# Patient Record
Sex: MALE | Race: WHITE | NOT HISPANIC OR LATINO | Employment: OTHER | URBAN - METROPOLITAN AREA
[De-identification: names, ages, dates, MRNs, and addresses within clinical notes are randomized per-mention and may not be internally consistent; named-entity substitution may affect disease eponyms.]

---

## 2018-07-16 ENCOUNTER — APPOINTMENT (OUTPATIENT)
Dept: RADIOLOGY | Facility: MEDICAL CENTER | Age: 73
End: 2018-07-16
Attending: EMERGENCY MEDICINE
Payer: COMMERCIAL

## 2018-07-16 ENCOUNTER — HOSPITAL ENCOUNTER (OUTPATIENT)
Facility: MEDICAL CENTER | Age: 73
End: 2018-07-17
Attending: EMERGENCY MEDICINE | Admitting: HOSPITALIST
Payer: COMMERCIAL

## 2018-07-16 DIAGNOSIS — S00.81XA ABRASION OF FOREHEAD, INITIAL ENCOUNTER: ICD-10-CM

## 2018-07-16 DIAGNOSIS — S42.92XA TRAUMATIC CLOSED DISPLACED FRACTURE OF LEFT SHOULDER WITH ANTERIOR DISLOCATION, INITIAL ENCOUNTER: ICD-10-CM

## 2018-07-16 DIAGNOSIS — S09.90XA CLOSED HEAD INJURY, INITIAL ENCOUNTER: ICD-10-CM

## 2018-07-16 DIAGNOSIS — R55 SYNCOPE, UNSPECIFIED SYNCOPE TYPE: ICD-10-CM

## 2018-07-16 LAB
ANION GAP SERPL CALC-SCNC: 14 MMOL/L (ref 0–11.9)
APTT PPP: 31.1 SEC (ref 24.7–36)
BASOPHILS # BLD AUTO: 0.2 % (ref 0–1.8)
BASOPHILS # BLD: 0.03 K/UL (ref 0–0.12)
BUN SERPL-MCNC: 16 MG/DL (ref 8–22)
CALCIUM SERPL-MCNC: 9.5 MG/DL (ref 8.5–10.5)
CHLORIDE SERPL-SCNC: 99 MMOL/L (ref 96–112)
CO2 SERPL-SCNC: 22 MMOL/L (ref 20–33)
CREAT SERPL-MCNC: 0.8 MG/DL (ref 0.5–1.4)
EOSINOPHIL # BLD AUTO: 0.01 K/UL (ref 0–0.51)
EOSINOPHIL NFR BLD: 0.1 % (ref 0–6.9)
ERYTHROCYTE [DISTWIDTH] IN BLOOD BY AUTOMATED COUNT: 51.6 FL (ref 35.9–50)
GLUCOSE SERPL-MCNC: 130 MG/DL (ref 65–99)
HCT VFR BLD AUTO: 46.2 % (ref 42–52)
HGB BLD-MCNC: 15.6 G/DL (ref 14–18)
IMM GRANULOCYTES # BLD AUTO: 0.11 K/UL (ref 0–0.11)
IMM GRANULOCYTES NFR BLD AUTO: 0.8 % (ref 0–0.9)
INR PPP: 1.16 (ref 0.87–1.13)
LYMPHOCYTES # BLD AUTO: 1.4 K/UL (ref 1–4.8)
LYMPHOCYTES NFR BLD: 10.7 % (ref 22–41)
MCH RBC QN AUTO: 34.5 PG (ref 27–33)
MCHC RBC AUTO-ENTMCNC: 33.8 G/DL (ref 33.7–35.3)
MCV RBC AUTO: 102.2 FL (ref 81.4–97.8)
MONOCYTES # BLD AUTO: 0.49 K/UL (ref 0–0.85)
MONOCYTES NFR BLD AUTO: 3.7 % (ref 0–13.4)
NEUTROPHILS # BLD AUTO: 11.04 K/UL (ref 1.82–7.42)
NEUTROPHILS NFR BLD: 84.5 % (ref 44–72)
NRBC # BLD AUTO: 0 K/UL
NRBC BLD-RTO: 0 /100 WBC
PLATELET # BLD AUTO: 209 K/UL (ref 164–446)
PMV BLD AUTO: 9.7 FL (ref 9–12.9)
POTASSIUM SERPL-SCNC: 4 MMOL/L (ref 3.6–5.5)
PROTHROMBIN TIME: 14.5 SEC (ref 12–14.6)
RBC # BLD AUTO: 4.52 M/UL (ref 4.7–6.1)
SODIUM SERPL-SCNC: 135 MMOL/L (ref 135–145)
TROPONIN I SERPL-MCNC: 0.01 NG/ML (ref 0–0.04)
WBC # BLD AUTO: 13.1 K/UL (ref 4.8–10.8)

## 2018-07-16 PROCEDURE — 84484 ASSAY OF TROPONIN QUANT: CPT

## 2018-07-16 PROCEDURE — 700111 HCHG RX REV CODE 636 W/ 250 OVERRIDE (IP): Performed by: EMERGENCY MEDICINE

## 2018-07-16 PROCEDURE — 85730 THROMBOPLASTIN TIME PARTIAL: CPT

## 2018-07-16 PROCEDURE — 71045 X-RAY EXAM CHEST 1 VIEW: CPT

## 2018-07-16 PROCEDURE — 23650 CLTX SHO DSLC W/MNPJ WO ANES: CPT

## 2018-07-16 PROCEDURE — 70450 CT HEAD/BRAIN W/O DYE: CPT

## 2018-07-16 PROCEDURE — G0378 HOSPITAL OBSERVATION PER HR: HCPCS

## 2018-07-16 PROCEDURE — 96361 HYDRATE IV INFUSION ADD-ON: CPT

## 2018-07-16 PROCEDURE — 85025 COMPLETE CBC W/AUTO DIFF WBC: CPT

## 2018-07-16 PROCEDURE — A9270 NON-COVERED ITEM OR SERVICE: HCPCS | Performed by: EMERGENCY MEDICINE

## 2018-07-16 PROCEDURE — 93005 ELECTROCARDIOGRAM TRACING: CPT | Performed by: EMERGENCY MEDICINE

## 2018-07-16 PROCEDURE — 99220 PR INITIAL OBSERVATION CARE,LEVL III: CPT | Performed by: HOSPITALIST

## 2018-07-16 PROCEDURE — 96375 TX/PRO/DX INJ NEW DRUG ADDON: CPT

## 2018-07-16 PROCEDURE — 73030 X-RAY EXAM OF SHOULDER: CPT | Mod: LT

## 2018-07-16 PROCEDURE — 700105 HCHG RX REV CODE 258: Performed by: EMERGENCY MEDICINE

## 2018-07-16 PROCEDURE — 72125 CT NECK SPINE W/O DYE: CPT

## 2018-07-16 PROCEDURE — 85610 PROTHROMBIN TIME: CPT

## 2018-07-16 PROCEDURE — 36415 COLL VENOUS BLD VENIPUNCTURE: CPT

## 2018-07-16 PROCEDURE — 700102 HCHG RX REV CODE 250 W/ 637 OVERRIDE(OP): Performed by: EMERGENCY MEDICINE

## 2018-07-16 PROCEDURE — 700101 HCHG RX REV CODE 250: Performed by: EMERGENCY MEDICINE

## 2018-07-16 PROCEDURE — 80048 BASIC METABOLIC PNL TOTAL CA: CPT

## 2018-07-16 PROCEDURE — 99285 EMERGENCY DEPT VISIT HI MDM: CPT

## 2018-07-16 PROCEDURE — 96374 THER/PROPH/DIAG INJ IV PUSH: CPT

## 2018-07-16 RX ORDER — AMOXICILLIN 250 MG
2 CAPSULE ORAL 2 TIMES DAILY
Status: DISCONTINUED | OUTPATIENT
Start: 2018-07-16 | End: 2018-07-17 | Stop reason: HOSPADM

## 2018-07-16 RX ORDER — SIMVASTATIN 20 MG
20 TABLET ORAL EVERY EVENING
COMMUNITY

## 2018-07-16 RX ORDER — SODIUM CHLORIDE 9 MG/ML
INJECTION, SOLUTION INTRAVENOUS CONTINUOUS
Status: DISCONTINUED | OUTPATIENT
Start: 2018-07-16 | End: 2018-07-17 | Stop reason: HOSPADM

## 2018-07-16 RX ORDER — FUROSEMIDE 20 MG/1
20-40 TABLET ORAL 2 TIMES DAILY
COMMUNITY

## 2018-07-16 RX ORDER — HYDROCODONE BITARTRATE AND ACETAMINOPHEN 5; 325 MG/1; MG/1
1 TABLET ORAL ONCE
Status: COMPLETED | OUTPATIENT
Start: 2018-07-16 | End: 2018-07-16

## 2018-07-16 RX ORDER — ONDANSETRON 2 MG/ML
4 INJECTION INTRAMUSCULAR; INTRAVENOUS EVERY 4 HOURS PRN
Status: DISCONTINUED | OUTPATIENT
Start: 2018-07-16 | End: 2018-07-17 | Stop reason: HOSPADM

## 2018-07-16 RX ORDER — OXYCODONE HYDROCHLORIDE 5 MG/1
2.5 TABLET ORAL
Status: DISCONTINUED | OUTPATIENT
Start: 2018-07-16 | End: 2018-07-17

## 2018-07-16 RX ORDER — ONDANSETRON 4 MG/1
4 TABLET, ORALLY DISINTEGRATING ORAL EVERY 4 HOURS PRN
Status: DISCONTINUED | OUTPATIENT
Start: 2018-07-16 | End: 2018-07-17 | Stop reason: HOSPADM

## 2018-07-16 RX ORDER — DABIGATRAN ETEXILATE 150 MG/1
150 CAPSULE ORAL 2 TIMES DAILY
Status: DISCONTINUED | OUTPATIENT
Start: 2018-07-16 | End: 2018-07-17 | Stop reason: HOSPADM

## 2018-07-16 RX ORDER — ACETAMINOPHEN 325 MG/1
650 TABLET ORAL EVERY 6 HOURS PRN
Status: DISCONTINUED | OUTPATIENT
Start: 2018-07-16 | End: 2018-07-17 | Stop reason: HOSPADM

## 2018-07-16 RX ORDER — ONDANSETRON 2 MG/ML
4 INJECTION INTRAMUSCULAR; INTRAVENOUS ONCE
Status: COMPLETED | OUTPATIENT
Start: 2018-07-16 | End: 2018-07-16

## 2018-07-16 RX ORDER — VALSARTAN 160 MG/1
160 TABLET ORAL DAILY
COMMUNITY

## 2018-07-16 RX ORDER — LATANOPROST 50 UG/ML
1 SOLUTION/ DROPS OPHTHALMIC NIGHTLY
COMMUNITY

## 2018-07-16 RX ORDER — POLYETHYLENE GLYCOL 3350 17 G/17G
1 POWDER, FOR SOLUTION ORAL
Status: DISCONTINUED | OUTPATIENT
Start: 2018-07-16 | End: 2018-07-17 | Stop reason: HOSPADM

## 2018-07-16 RX ORDER — OXYCODONE HYDROCHLORIDE 5 MG/1
5 TABLET ORAL
Status: DISCONTINUED | OUTPATIENT
Start: 2018-07-16 | End: 2018-07-17

## 2018-07-16 RX ORDER — KETAMINE HYDROCHLORIDE 50 MG/ML
0.5 INJECTION, SOLUTION INTRAMUSCULAR; INTRAVENOUS ONCE
Status: COMPLETED | OUTPATIENT
Start: 2018-07-16 | End: 2018-07-16

## 2018-07-16 RX ORDER — SODIUM CHLORIDE 9 MG/ML
1000 INJECTION, SOLUTION INTRAVENOUS ONCE
Status: COMPLETED | OUTPATIENT
Start: 2018-07-16 | End: 2018-07-16

## 2018-07-16 RX ORDER — BISACODYL 10 MG
10 SUPPOSITORY, RECTAL RECTAL
Status: DISCONTINUED | OUTPATIENT
Start: 2018-07-16 | End: 2018-07-17 | Stop reason: HOSPADM

## 2018-07-16 RX ORDER — MORPHINE SULFATE 2 MG/ML
2 INJECTION, SOLUTION INTRAMUSCULAR; INTRAVENOUS
Status: DISCONTINUED | OUTPATIENT
Start: 2018-07-16 | End: 2018-07-17

## 2018-07-16 RX ORDER — DABIGATRAN ETEXILATE 150 MG/1
150 CAPSULE ORAL 2 TIMES DAILY
COMMUNITY

## 2018-07-16 RX ADMIN — SODIUM CHLORIDE 1000 ML: 9 INJECTION, SOLUTION INTRAVENOUS at 21:12

## 2018-07-16 RX ADMIN — KETAMINE HYDROCHLORIDE 64.5 MG: 50 INJECTION, SOLUTION INTRAMUSCULAR; INTRAVENOUS at 21:28

## 2018-07-16 RX ADMIN — HYDROCODONE BITARTRATE AND ACETAMINOPHEN 1 TABLET: 5; 325 TABLET ORAL at 19:41

## 2018-07-16 RX ADMIN — ONDANSETRON HYDROCHLORIDE 4 MG: 2 INJECTION, SOLUTION INTRAMUSCULAR; INTRAVENOUS at 21:23

## 2018-07-16 ASSESSMENT — PAIN SCALES - GENERAL: PAINLEVEL_OUTOF10: 10

## 2018-07-16 ASSESSMENT — LIFESTYLE VARIABLES: DO YOU DRINK ALCOHOL: YES

## 2018-07-17 VITALS
SYSTOLIC BLOOD PRESSURE: 153 MMHG | OXYGEN SATURATION: 94 % | RESPIRATION RATE: 16 BRPM | WEIGHT: 291.67 LBS | HEART RATE: 67 BPM | HEIGHT: 72 IN | BODY MASS INDEX: 39.51 KG/M2 | TEMPERATURE: 98.2 F | DIASTOLIC BLOOD PRESSURE: 71 MMHG

## 2018-07-17 PROBLEM — R55 SYNCOPE: Status: ACTIVE | Noted: 2018-07-17

## 2018-07-17 PROBLEM — G93.40 ACUTE ENCEPHALOPATHY: Status: ACTIVE | Noted: 2018-07-17

## 2018-07-17 PROBLEM — I48.20 CHRONIC ATRIAL FIBRILLATION (HCC): Chronic | Status: ACTIVE | Noted: 2018-07-17

## 2018-07-17 PROBLEM — S43.015A ANTERIOR DISLOCATION OF LEFT SHOULDER: Status: ACTIVE | Noted: 2018-07-17

## 2018-07-17 PROBLEM — E03.9 HYPOTHYROIDISM: Chronic | Status: ACTIVE | Noted: 2018-07-17

## 2018-07-17 LAB
LV EJECT FRACT  99904: 65
LV EJECT FRACT MOD 2C 99903: 66.17
LV EJECT FRACT MOD 4C 99902: 71
LV EJECT FRACT MOD BP 99901: 69.38

## 2018-07-17 PROCEDURE — 700102 HCHG RX REV CODE 250 W/ 637 OVERRIDE(OP): Performed by: HOSPITALIST

## 2018-07-17 PROCEDURE — 97161 PT EVAL LOW COMPLEX 20 MIN: CPT

## 2018-07-17 PROCEDURE — G0378 HOSPITAL OBSERVATION PER HR: HCPCS

## 2018-07-17 PROCEDURE — G8988 SELF CARE GOAL STATUS: HCPCS | Mod: CI

## 2018-07-17 PROCEDURE — 96376 TX/PRO/DX INJ SAME DRUG ADON: CPT

## 2018-07-17 PROCEDURE — 99217 PR OBSERVATION CARE DISCHARGE: CPT | Performed by: HOSPITALIST

## 2018-07-17 PROCEDURE — 93306 TTE W/DOPPLER COMPLETE: CPT

## 2018-07-17 PROCEDURE — 93880 EXTRACRANIAL BILAT STUDY: CPT

## 2018-07-17 PROCEDURE — G8979 MOBILITY GOAL STATUS: HCPCS | Mod: CI

## 2018-07-17 PROCEDURE — G8978 MOBILITY CURRENT STATUS: HCPCS | Mod: CI

## 2018-07-17 PROCEDURE — 93306 TTE W/DOPPLER COMPLETE: CPT | Mod: 26 | Performed by: INTERNAL MEDICINE

## 2018-07-17 PROCEDURE — 700105 HCHG RX REV CODE 258: Performed by: HOSPITALIST

## 2018-07-17 PROCEDURE — G8987 SELF CARE CURRENT STATUS: HCPCS | Mod: CJ

## 2018-07-17 PROCEDURE — 700111 HCHG RX REV CODE 636 W/ 250 OVERRIDE (IP): Performed by: HOSPITALIST

## 2018-07-17 PROCEDURE — A9270 NON-COVERED ITEM OR SERVICE: HCPCS | Performed by: HOSPITALIST

## 2018-07-17 PROCEDURE — 97165 OT EVAL LOW COMPLEX 30 MIN: CPT

## 2018-07-17 PROCEDURE — G8980 MOBILITY D/C STATUS: HCPCS | Mod: CI

## 2018-07-17 RX ORDER — SODIUM CHLORIDE 9 MG/ML
INJECTION, SOLUTION INTRAVENOUS
Status: DISCONTINUED
Start: 2018-07-17 | End: 2018-07-17 | Stop reason: HOSPADM

## 2018-07-17 RX ORDER — MORPHINE SULFATE 4 MG/ML
2 INJECTION, SOLUTION INTRAMUSCULAR; INTRAVENOUS
Status: DISCONTINUED | OUTPATIENT
Start: 2018-07-17 | End: 2018-07-17 | Stop reason: HOSPADM

## 2018-07-17 RX ORDER — OXYCODONE HYDROCHLORIDE 5 MG/1
2.5 TABLET ORAL
Status: DISCONTINUED | OUTPATIENT
Start: 2018-07-17 | End: 2018-07-17 | Stop reason: HOSPADM

## 2018-07-17 RX ORDER — OXYCODONE HYDROCHLORIDE 5 MG/1
5 TABLET ORAL
Status: DISCONTINUED | OUTPATIENT
Start: 2018-07-17 | End: 2018-07-17 | Stop reason: HOSPADM

## 2018-07-17 RX ORDER — AMOXICILLIN 250 MG
CAPSULE ORAL
Status: DISCONTINUED
Start: 2018-07-17 | End: 2018-07-17 | Stop reason: HOSPADM

## 2018-07-17 RX ORDER — ONDANSETRON 2 MG/ML
INJECTION INTRAMUSCULAR; INTRAVENOUS
Status: DISPENSED
Start: 2018-07-17 | End: 2018-07-17

## 2018-07-17 RX ADMIN — DABIGATRAN ETEXILATE MESYLATE 150 MG: 150 CAPSULE ORAL at 06:34

## 2018-07-17 RX ADMIN — ONDANSETRON 4 MG: 2 INJECTION INTRAMUSCULAR; INTRAVENOUS at 00:59

## 2018-07-17 RX ADMIN — SODIUM CHLORIDE: 9 INJECTION, SOLUTION INTRAVENOUS at 06:24

## 2018-07-17 ASSESSMENT — COGNITIVE AND FUNCTIONAL STATUS - GENERAL
DRESSING REGULAR LOWER BODY CLOTHING: A LITTLE
MOBILITY SCORE: 19
DAILY ACTIVITIY SCORE: 23
DAILY ACTIVITIY SCORE: 21
SUGGESTED CMS G CODE MODIFIER MOBILITY: CK
SUGGESTED CMS G CODE MODIFIER DAILY ACTIVITY: CI
MOBILITY SCORE: 24
TOILETING: A LITTLE
HELP NEEDED FOR BATHING: A LITTLE
WALKING IN HOSPITAL ROOM: A LITTLE
MOVING FROM LYING ON BACK TO SITTING ON SIDE OF FLAT BED: A LITTLE
DRESSING REGULAR LOWER BODY CLOTHING: A LITTLE
MOVING TO AND FROM BED TO CHAIR: A LITTLE
CLIMB 3 TO 5 STEPS WITH RAILING: A LITTLE
SUGGESTED CMS G CODE MODIFIER MOBILITY: CH
SUGGESTED CMS G CODE MODIFIER DAILY ACTIVITY: CJ
STANDING UP FROM CHAIR USING ARMS: A LITTLE

## 2018-07-17 ASSESSMENT — COPD QUESTIONNAIRES
COPD SCREENING SCORE: 2
DURING THE PAST 4 WEEKS HOW MUCH DID YOU FEEL SHORT OF BREATH: NONE/LITTLE OF THE TIME
DO YOU EVER COUGH UP ANY MUCUS OR PHLEGM?: YES, A FEW DAYS A WEEK OR MONTH
HAVE YOU SMOKED AT LEAST 100 CIGARETTES IN YOUR ENTIRE LIFE: NO/DON'T KNOW
HAVE YOU SMOKED AT LEAST 100 CIGARETTES IN YOUR ENTIRE LIFE: NO/DON'T KNOW
DURING THE PAST 4 WEEKS HOW MUCH DID YOU FEEL SHORT OF BREATH: NONE/LITTLE OF THE TIME
COPD SCREENING SCORE: 3
IN THE PAST 12 MONTHS DO YOU DO LESS THAN YOU USED TO BECAUSE OF YOUR BREATHING PROBLEMS: DISAGREE/UNSURE
DO YOU EVER COUGH UP ANY MUCUS OR PHLEGM?: NO/ONLY WITH OCCASIONAL COLDS OR INFECTIONS

## 2018-07-17 ASSESSMENT — LIFESTYLE VARIABLES
ON A TYPICAL DAY WHEN YOU DRINK ALCOHOL HOW MANY DRINKS DO YOU HAVE: 2
EVER HAD A DRINK FIRST THING IN THE MORNING TO STEADY YOUR NERVES TO GET RID OF A HANGOVER: NO
EVER_SMOKED: NEVER
AVERAGE NUMBER OF DAYS PER WEEK YOU HAVE A DRINK CONTAINING ALCOHOL: 7
EVER_SMOKED: NEVER
DOES PATIENT WANT TO TALK TO SOMEONE ABOUT QUITTING: NO
DOES PATIENT WANT TO STOP DRINKING: YES
HOW MANY TIMES IN THE PAST YEAR HAVE YOU HAD 5 OR MORE DRINKS IN A DAY: 0
TOTAL SCORE: 2
TOTAL SCORE: 2
ALCOHOL_USE: YES
CONSUMPTION TOTAL: POSITIVE
EVER FELT BAD OR GUILTY ABOUT YOUR DRINKING: YES
HAVE YOU EVER FELT YOU SHOULD CUT DOWN ON YOUR DRINKING: YES
HAVE PEOPLE ANNOYED YOU BY CRITICIZING YOUR DRINKING: NO
TOTAL SCORE: 2

## 2018-07-17 ASSESSMENT — CHA2DS2 SCORE
PRIOR STROKE OR TIA OR THROMBOEMBOLISM: NO
SEX: MALE
HYPERTENSION: YES
AGE 75 OR GREATER: NO
VASCULAR DISEASE: NO
CHA2DS2 VASC SCORE: 2
CHF OR LEFT VENTRICULAR DYSFUNCTION: NO
DIABETES: NO
AGE 65 TO 74: YES

## 2018-07-17 ASSESSMENT — GAIT ASSESSMENTS
ASSISTIVE DEVICE: FRONT WHEEL WALKER
GAIT LEVEL OF ASSIST: STAND BY ASSIST
DISTANCE (FEET): 150
DEVIATION: BRADYKINETIC

## 2018-07-17 ASSESSMENT — PATIENT HEALTH QUESTIONNAIRE - PHQ9
1. LITTLE INTEREST OR PLEASURE IN DOING THINGS: NOT AT ALL
2. FEELING DOWN, DEPRESSED, IRRITABLE, OR HOPELESS: NOT AT ALL
SUM OF ALL RESPONSES TO PHQ9 QUESTIONS 1 AND 2: 0

## 2018-07-17 ASSESSMENT — ACTIVITIES OF DAILY LIVING (ADL): TOILETING: INDEPENDENT

## 2018-07-17 ASSESSMENT — PAIN SCALES - GENERAL
PAINLEVEL_OUTOF10: 0

## 2018-07-17 NOTE — PROGRESS NOTES
Pt discharged via wheelchair with wife to car to home. Pt was AOx4 at time of discharge. PIVs removed, tele box taken off. RN went over discharge instructions thoroughly including medications, healthy diet, activity, signs and symptoms of infection, heart attack and stroke. RN reviewed when to call MD and when to call 911. Paperwork signed and all questions answered. Follow up appointment recommended to patient, to see his PCP within 1 week. Pt verbally understands that following through with the appointment is necessary. All personal belongings with patient. Copy of AVS discharge instructions given to patient.   Pt refused flu and pneumonia vaccines.

## 2018-07-17 NOTE — PROGRESS NOTES
Bedside report completed with noc RN. Assumed patient care. Pt resting, denies pain, he is alert and awake. Wife at bedside. Bed locked in low position, call light within reach.  Patient is asking when he will be discharged. RN updated him again on POC, carotid duplex and echo were ordered.

## 2018-07-17 NOTE — ED NOTES
Pt resting in room. Pt still sleepy though much more awake. Pt able to answer questions and stay awake while talking with him. VSS.

## 2018-07-17 NOTE — ED NOTES
Pt to xray via wheelchair.  Pt will transfer to [facility] after xray completed.

## 2018-07-17 NOTE — H&P
DATE OF ADMISSION:  07/16/2018    PRIMARY CARE PHYSICIAN:  In [facility].    CHIEF COMPLAINT:  [place] a casino.    HISTORY OF PRESENT ILLNESS:  Patient is a 72-year-old male that has a history   of hypertension and atrial fibrillation.  He was visiting from [facility] and he was in a casino with his significant other.  He apparently   had not had much to eat or drink this morning and then had 2 alcoholic   beverages.  He went to use the restroom and he passed out.  It is not clear on   the circumstances as he does not recall that currently and I am unable to get   any type of history from the patient as he has received ketamine and is quite   sedated and is unable to give me any history, so a lot of the history has   been obtained from ER notes.    REVIEW OF SYSTEMS:  Unable to obtain secondary to the patient's medical   condition.    ALLERGIES:  No known drug allergies.    PAST MEDICAL HISTORY:  1.  Hypertension.  2.  Atrial fibrillation.  3.  Hyperlipidemia and hypothyroidism.    PAST SURGICAL HISTORY:  Knee surgery, tonsillectomy, thumb surgery, anal   fistula surgery, hernia repair, and cataract surgery.    SOCIAL HISTORY:  He obviously drinks, negative for tobacco or drug use.    FAMILY HISTORY:  Unable to obtain at this time.    HOME MEDICATIONS:  Unable to obtain at this time.    PHYSICAL EXAMINATION:  VITAL SIGNS:  Blood pressure is 104/50, pulse of 61, respirations 18,   temperature 36.3, and oxygen saturation is 97% on 2 L nasal cannula.  Weight   is 128.8 kilograms.  GENERAL:  Patient is currently somnolent.  He does awaken and answer some   questions, but he has been sedated, so I am unable to get any further details   from him.  HEENT:  Dry mucous membranes.  No oropharyngeal exudates.  He has a   superficial laceration to his forehead.  Eyes, EOMI, PERRLA.  NECK:  No lymphadenopathy, no JVD.  CARDIOVASCULAR:  Irregularly irregular.  No murmurs.  LUNGS:  Clear to auscultation  bilaterally.  No rales or rhonchi.  ABDOMEN:  Positive bowel sounds, soft, and nondistended.  EXTREMITIES:  No clubbing or cyanosis.  He does have 1-2+ pitting edema in the   bilateral lower extremities.  NEUROLOGIC:  He is sedated.    LABORATORY DATA:  WBC is 13.1, hemoglobin 15.6, hematocrit 46.2, .2,   and platelets 209.  Sodium 135, potassium 4, BUN 16, creatinine 0.80.    Troponin is 0.01.    IMAGING:  Shoulder x-ray shows dislocated left humeral head.    ASSESSMENT AND PLAN:  Patient is a 72-year-old male that presents after   syncopal event.  1.  Syncope, etiology is not clear.  He definitely has multiple risk factors   including atrial fibrillation.  He may have had a vasovagal syncope as he was   using the restroom.  He was also apparently dehydrated.  He has not been   eating or drinking, but he was just drinking alcohol.  Nonetheless, he is   going to be put under observation status with cardiac monitoring.  We will   check orthostatic vitals and I have ordered a carotid ultrasound and an   echocardiogram.  2.  Left dislocated shoulder.  He was given ketamine in the emergency   department and that is the reason he is sedated right now.  We will await for   him to wake up from the ketamine and then he will need to be treated for pain   with oral pain and IV pain medications, but we need to watch for sedation.  He   will need physical therapy and occupational therapy.  3.  History of chronic atrial fibrillation, continue with Pradaxa 150 mg   b.i.d.  I have held any other medications as he is somewhat bradycardic.  He   is not clear what blood pressure medications he is on at home.  4.  Acute encephalopathy.  This is secondary to sedative medications and   ketamine.  5.  He is a FULL CODE.       ____________________________________     MD ROXANA Jhaveri / STACEY    DD:  07/17/2018 02:33:30  DT:  07/17/2018 02:54:20    D#:  [id number]  Job#:  [id number]

## 2018-07-17 NOTE — ED TRIAGE NOTES
"Riley Flores  72 y.o.  Chief Complaint   Patient presents with   • T-5000 GLF     f[place], ambulates with cane, hit head, denies LOC.   • Shoulder Pain     left shoulder pain, CMS+     Patient reports ETOH x4 drinks today, \"may be related to fall\", patient A&O, cooperative, reports taking Pradaxa for hx of a-fib.  Family at patient side.    Explained wait time and triage process to pt. Pt placed back out in lobby, told to notify ED tech or triage RN of any changes, verbalized understanding.    "

## 2018-07-17 NOTE — PROGRESS NOTES
Report received from ED RN. Patient trasported to floor. Patient a&Ox4. Monitor room notified. Patient afib 60's. VSS. Patient on RA. Patient declines any pain. Patient oriented to unit routine. Patient oriented to bathroom. Patient fall risk discussed, patient is scoring at a moderate fall risk. Patient educated to call for assistance before ambulating. Fall precautions in place. Call light within reach.

## 2018-07-17 NOTE — ED NOTES
Pt sitting up in chair. VSS. Even and unlabored respirations noted. Pt awaiting his room to be cleaned on the floor. Updated on POC.

## 2018-07-17 NOTE — ED NOTES
RT and this RN at bedside still. Pt waking up slowly from sedation. Pt opening eyes and moving arm around slowly and spontaneously. VSS. Pt 98% on 4L NC.

## 2018-07-17 NOTE — ED NOTES
Pt came forward to ED tech concerned about pts continued intense pain. Pt told that I will ask if the triage RN can come and see them. Triage Rn updated.

## 2018-07-17 NOTE — ED NOTES
Bedside report given to receiving RN for T707-1. All questions answered, all belongings accounted for at time of transfer.

## 2018-07-17 NOTE — ED NOTES
Pt wheeled to restroom with WC. Pt sitting up in chair waiting for room upstairs. Even and unlabored respirations noted.

## 2018-07-17 NOTE — THERAPY
"Occupational Therapy Evaluation completed.   Functional Status:  SBA/CGA for functional transfers; SPV for grooming while standing; SPV for toileting; SPV for self-feeding  Plan of Care: Will benefit from Occupational Therapy 3 times per week  Discharge Recommendations:  Equipment: Will Continue to Assess for Equipment Needs. Post-acute therapy Discharge to home with outpatient or home health for additional skilled therapy services.    See \"Rehab Therapy-Acute\" Patient Summary Report for complete documentation.    OT eval completed on 71 YO M admitted after syncope episode and dislocated L shoulder. Pt with LUE in sling and observed to be using L hand during functional tasks as needed without pain. Pt limited due to poor safety awareness during functional transfers with FWW. Pt educated on importance of using FWW and placement during functional transfers however pt continued to abandon. Pt's SO present for tx session and reports she will be available to assist as needed upon d/c. Will continue to follow for acute OT services while in-house. Pt would benefit from outpatient and  upon d/c.  "

## 2018-07-17 NOTE — ED TRIAGE NOTES
.  Chief Complaint   Patient presents with   • T-5000 GLF     fell in BR at [facility], ambulates with cane, hit head, denies LOC.   • Shoulder Pain     left shoulder pain, CMS+   Agree with triage assessment. Fall today at approximately 1400.  + left shoulder deformity noted.  LTD ROM.  Pt right handed.

## 2018-07-17 NOTE — DISCHARGE INSTRUCTIONS
Discharge Instructions    Discharged to home by car with relative. Discharged via wheelchair, hospital escort: Yes.  Special equipment needed: Not Applicable    Be sure to schedule a follow-up appointment with your primary care doctor or any specialists as instructed.     Discharge Plan:   Diet Plan: Discussed  Activity Level: Discussed  Confirmed Follow up Appointment: Appointment Scheduled  Medication Reconciliation Updated: Yes  Influenza Vaccine Indication: Not indicated: Previously immunized this influenza season and > 8 years of age    I understand that a diet low in cholesterol, fat, and sodium is recommended for good health. Unless I have been given specific instructions below for another diet, I accept this instruction as my diet prescription.   Other diet: heart healthy    Special Instructions:   Syncope  Introduction  Syncope is when you lose temporarily pass out (faint). Signs that you may be about to pass out include:  · Feeling dizzy or light-headed.  · Feeling sick to your stomach (nauseous).  · Seeing all white or all black.  · Having cold, clammy skin.  If you passed out, get help right away. Call your local emergency services (911 in the [place] Do not drive yourself to the hospital.  Follow these instructions at home:  Pay attention to any changes in your symptoms. Take these actions to help with your condition:  · Have someone stay with you until you feel stable.  · Do not drive, use machinery, or play sports until your doctor says it is okay.  · Keep all follow-up visits as told by your doctor. This is important.  · If you start to feel like you might pass out, lie down right away and raise (elevate) your feet above the level of your heart. Breathe deeply and steadily. Wait until all of the symptoms are gone.  · Drink enough fluid to keep your pee (urine) clear or pale yellow.  · If you are taking blood pressure or heart medicine, get up slowly and spend many minutes getting ready to sit and then  stand. This can help with dizziness.  · Take over-the-counter and prescription medicines only as told by your doctor.  Get help right away if:  · You have a very bad headache.  · You have unusual pain in your chest, tummy, or back.  · You are bleeding from your mouth or rectum.  · You have black or tarry poop (stool).  · You have a very fast or uneven heartbeat (palpitations).  · It hurts to breathe.  · You pass out once or more than once.  · You have jerky movements that you cannot control (seizure).  · You are confused.  · You have trouble walking.  · You are very weak.  · You have vision problems.  These symptoms may be an emergency. Do not wait to see if the symptoms will go away. Get medical help right away. Call your local emergency services (911 in the [place] Do not drive yourself to the hospital.   This information is not intended to replace advice given to you by your health care provider. Make sure you discuss any questions you have with your health care provider.  Document Released: 06/05/2009 Document Revised: 05/25/2017 Document Reviewed: 08/31/2016  © 2017 Elsevier      · Is patient discharged on Warfarin / Coumadin?   No     Depression / Suicide Risk    As you are discharged from this [facility] facility, it is important to learn how to keep safe from harming yourself.    Recognize the warning signs:  · Abrupt changes in personality, positive or negative- including increase in energy   · Giving away possessions  · Change in eating patterns- significant weight changes-  positive or negative  · Change in sleeping patterns- unable to sleep or sleeping all the time   · Unwillingness or inability to communicate  · Depression  · Unusual sadness, discouragement and loneliness  · Talk of wanting to die  · Neglect of personal appearance   · Rebelliousness- reckless behavior  · Withdrawal from people/activities they love  · Confusion- inability to concentrate     If you or a loved one observes any of these  behaviors or has concerns about self-harm, here's what you can do:  · Talk about it- your feelings and reasons for harming yourself  · Remove any means that you might use to hurt yourself (examples: pills, rope, extension cords, firearm)  · Get professional help from the community (Mental Health, Substance Abuse, psychological counseling)  · Do not be alone:Call your Safe Contact- someone whom you trust who will be there for you.  · Call your local CRISIS HOTLINE 786-7689 or 332-428-8287  · Call your local Children's Mobile Crisis Response Team [place] (030) 279-2706 or www.Invup  · Call the toll free National Suicide Prevention Hotlines   · National Suicide Prevention Lifeline 421-796-SMVR (1950)  · National Hope Line Network 800-SUICIDE (333-1713)

## 2018-07-17 NOTE — ASSESSMENT & PLAN NOTE
Cardiac monitoring  Check an echocardiogram  Carotid ultrasound  Etiology not clear  May be secondary to atrial fibrillation versus vasovagal as he was using the restroom versus dehydration as he had not eaten or drinking much and was only drinking alcohol

## 2018-07-17 NOTE — THERAPY
"Physical Therapy Evaluation completed.   Bed Mobility:  Supine to Sit:  (in chair pre)  Transfers: Sit to Stand: Stand by Assist  Gait: Level Of Assist: Stand by Assist with Front-Wheel Walker       Plan of Care: Patient with no further skilled PT needs in the acute care setting at this time  Discharge Recommendations: Equipment: No Equipment Needed.     See \"Rehab Therapy-Acute\" Patient Summary Report for complete documentation.     Pt presented to PT for primary risk reduction for LOB/falling. Pt was able to demonstrate SBA for all functional mobility at this time w/FWW. Pt educated on limiting shoulder extension and external rotation, shoulder positioning, and icing secondary to L shoulder dislocation. Pt was receptive of PT education. No aleah LOB noted during flat surface ambulation and climb up/down stairs. Pt appears to be near functional baseline at this time. Pt is in no acute skilled PT needs, however, will recommend outpatient therpy needs for L shoulder dislocation. Anticipate pt to d/c home with family suppport once medically clear.   "

## 2018-07-17 NOTE — ED NOTES
ERP at bedside for conscious sedation with RT at bedside, ED tech, and this RN for assistance. Time out called prior to procedure. All agree.

## 2018-07-17 NOTE — ED PROVIDER NOTES
ED Provider Note    Scribed for Rosalva Ennis M.D. by Vielka Su. 7/16/2018  7:17 PM    Means of arrival: EMS  History obtained from: patient  History limited by: none      CHIEF COMPLAINT  Chief Complaint   Patient presents with   • T-5000 GLF     [place], ambulates with cane, hit head, denies LOC.   • Shoulder Pain     left shoulder pain, CMS+       HPI  Riley Flores is a 72 y.o. male who presents to the Emergency Department for a ground level fall onset tonight. At that time patient was at a casino playing slots when he got up to use the bathroom. Patient remembers sitting on the toilet then having a syncopal episode. Patient woke on the floor with the door open. He notes that he hit his head on something because blood was found on the ground. He denies any chest pain prior to the event.There is now some noted left shoulder pain and presents mostly for this. Associated mild headache, diffuse neck pain, and chronic leg swelling. Denies nausea, vomiting, trouble breathing, elbow pain, wrist pain, or back pain. This has never happened to patient before. Does admit to three alcoholic beverages tonight. History of hyperlipidemia, hypertension, and afib. Medicates with Pradaxa. No allergies to medication. Patient is from [place] and is here on vacation.    REVIEW OF SYSTEMS  Pertinent positive include ground level fall, syncopal episode, loss of consciousness, shoulder pain, headache, neck pain, leg swelling.   Pertinent negative include chest pain, nausea, vomiting, trouble breathing, elbow pain, wrist pain, or back pain.   All other systems reviewed and are negative.  C    PAST MEDICAL HISTORY  Hypertension  hyperlipidemia  afib    SOCIAL HISTORY  From [place]    SURGICAL HISTORY  patient denies any surgical history    CURRENT MEDICATIONS  Pradaxa    ALLERGIES  No Known Allergies    PHYSICAL EXAM   VITAL SIGNS: BP (!) 96/53   Pulse (!) 59   Temp 36.3 °C (97.4 °F)   Resp 14    Ht 1.829 m (6')   Wt (!) 128.8 kg (284 lb)   SpO2 95%   BMI 38.52 kg/m²    Constitutional: Elderly male, Alert in no apparent distress.  HENT: Normocephalic, Deep skin avulsion to forehead, Bilateral external ears normal. Nose normal.  Moist mucous membranes.  Oropharynx clear.  Eyes: Pupils are equal and reactive. Conjunctiva normal.   Neck: Midline c-spine pain, Supple, full range of motion  Heart: Regular rate and rhythm.  No murmurs.  2+ radial pulses bilaterally.  Lungs: No respiratory distress, normal work of breathing. Lungs clear to auscultation bilaterally.  Abdomen Soft, no distention.  No tenderness to palpation.  Musculoskeletal:  Left shoulder and left scapular pain with obvious deformity concerning for dislocation, 2+ bilateral lower extremity edema.   Back: Midline c-spine pain, no midline T or L-spine tenderness  Skin: Warm, Dry. Deep skin avulsion to forehead  Neurologic: Alert and oriented x3. Moving all extremities spontaneously without focal deficits.  Psychiatric: Affect normal, Mood normal, Appears appropriate and not intoxicated.    DIAGNOSTIC STUDIES  EKG  EKG personally reviewed by myself in the absence of a cardiologist showed:  Afib with rate of 55  Normal axis and intervals  No ectopy or hypertrophy  No ST or T wave change  Impression: Atrial fibrillation without ischemia    LABS  Personally reviewed by me  Labs Reviewed   CBC WITH DIFFERENTIAL - Abnormal; Notable for the following:        Result Value    WBC 13.1 (*)     RBC 4.52 (*)     .2 (*)     MCH 34.5 (*)     RDW 51.6 (*)     Neutrophils-Polys 84.50 (*)     Lymphocytes 10.70 (*)     Neutrophils (Absolute) 11.04 (*)     All other components within normal limits    Narrative:     Indicate which anticoagulants the patient is on:->UNKNOWN   BASIC METABOLIC PANEL - Abnormal; Notable for the following:     Glucose 130 (*)     Anion Gap 14.0 (*)     All other components within normal limits    Narrative:     Indicate which  anticoagulants the patient is on:->UNKNOWN   PROTHROMBIN TIME - Abnormal; Notable for the following:     INR 1.16 (*)     All other components within normal limits    Narrative:     Indicate which anticoagulants the patient is on:->UNKNOWN   TROPONIN    Narrative:     Indicate which anticoagulants the patient is on:->UNKNOWN   APTT    Narrative:     Indicate which anticoagulants the patient is on:->UNKNOWN   ESTIMATED GFR    Narrative:     Indicate which anticoagulants the patient is on:->UNKNOWN   CBC WITH DIFFERENTIAL   BASIC METABOLIC PANEL     RADIOLOGY  Personally reviewed by me  DX-SHOULDER 2+ LEFT   Final Result      Successful relocation of anterior dislocated left humeral head.      CT-CSPINE WITHOUT PLUS RECONS   Final Result         1.  Negative CT scan of the cervical spine.  No fracture or subluxation.      2.  Multilevel degenerative change greatest at C5-6.      CT-HEAD W/O   Final Result      1.  No acute intracranial process.      2.  Atrophy and small vessel ischemic change.      DX-CHEST-PORTABLE (1 VIEW)   Final Result   Addendum 1 of 1   There is acute anterior subcoracoid dislocation of the left humeral head.         Final      No acute cardiac or pulmonary abnormality is noted.      DX-SHOULDER 2+ LEFT   Final Result      Acute anterior subcoracoid dislocation of the left humeral head. No fracture identified.      Echocardiogram Comp W/O Cont    (Results Pending)   Carotid Duplex ([facility] Only)    (Results Pending)       PROCEDURES  Conscious Sedation Procedure Note    Indication: shoulder dislocation    Consent: I have discussed with the patient and/or the patient representative the indication, alternatives, and the possible risks and/or complications of the planned procedure and the anesthesia methods. The patient and/or patient representative appear to understand and agree to proceed.    Physician Involvement: The attending physician was present and supervising this  procedure.    Pre-Sedation Documentation and Exam: I have personally completed a history, physical exam & review of systems for this patient (see notes).  Airway Assessment: normal    Prior History of Anesthesia Complications: none    ASA Classification: Class 2 - some medical comorbidities    Sedation/ Anesthesia Plan: intravenous sedation    Medications Used: ketamine intravenously    Monitoring and Safety: The patient was placed on a cardiac monitor and vital signs, pulse oximetry and level of consciousness were continuously evaluated throughout the procedure. The patient was closely monitored until recovery from the medications was complete and the patient had returned to baseline status. Respiratory therapy was on standby at all times during the procedure.      Post-Sedation Vital Signs: Vital signs were reviewed and were stable after the procedure (see flow sheet for vitals)            Post-Sedation Exam: Lungs: clear to auscultation bilaterally without crackles or wheezing and Cardiovascular: regular rate and rhythm, no murmurs rubs or gallops           Complications: Patient developed mild hypoxia and bradypnea following medication administration. This improved quickly following short period of BVM.        Joint Reduction Procedure Note    Indication: Joint dislocation    Consent: The patient was counseled regarding the procedure, it's indications, risks, potential complications and alternatives and any questions were answered. Consent was obtained.    Procedure: The pre-reduction exam showed distal perfusion & neurologic function to be normal. The patient was placed in the appropriate position. Anesthesia/pain control was obtained using conscious sedation with ketamine intravenously. Reduction of the left shoulder was performed by direct traction and external rotation. Post reduction films were obtained and revealed satisfactory reduction. A post-reduction exam revealed distal perfusion & neurologic  function to be normal. The affected area was immobilized with an arm sling.    The patient tolerated the procedure well.    Complications: None    ED COURSE  Vitals:    07/16/18 2245 07/16/18 2300 07/16/18 2310 07/16/18 2315   BP:       Pulse: (!) 57 (!) 58 (!) 57 (!) 57   Resp:       Temp:       SpO2: 99% 98% 99% 98%   Weight:       Height:           Medications administered:  Medications   senna-docusate (PERICOLACE or SENOKOT S) 8.6-50 MG per tablet 2 Tab (not administered)     And   polyethylene glycol/lytes (MIRALAX) PACKET 1 Packet (not administered)     And   magnesium hydroxide (MILK OF MAGNESIA) suspension 30 mL (not administered)     And   bisacodyl (DULCOLAX) suppository 10 mg (not administered)   Respiratory Care per Protocol (not administered)   NS infusion (not administered)   ondansetron (ZOFRAN) syringe/vial injection 4 mg (not administered)   ondansetron (ZOFRAN ODT) dispertab 4 mg (not administered)   Pharmacy Consult Request ...Pain Management Review (not administered)     And   oxyCODONE immediate-release (ROXICODONE) tablet 2.5 mg (not administered)     And   oxyCODONE immediate-release (ROXICODONE) tablet 5 mg (not administered)     And   morphine sulfate injection 2 mg (not administered)   acetaminophen (TYLENOL) tablet 650 mg (not administered)   dabigatran (PRADAXA) capsule 150 mg (not administered)   NS infusion 1,000 mL (0 mL Intravenous Stopped 7/16/18 2305)   HYDROcodone-acetaminophen (NORCO) 5-325 MG per tablet 1 Tab (1 Tab Oral Given 7/16/18 1941)   ketamine (KETALAR) 50 mg/ml injection (64.5 mg Intravenous Given 7/16/18 2128)   ondansetron (ZOFRAN) syringe/vial injection 4 mg (4 mg Intravenous Given 7/16/18 2123)       7:17 PM Patient seen and examined at bedside. The patient presents with ground level fall. Ordered for head CT, cspine, cbc with differential, basic metabolic panel, and other labs, to evaluate. Patient will be treated with 1000ml NS infusion for hypotension, syncopy,  and possible dehydration.Patient will also be treated with 325mg norco for his symptoms. Explained that he may need to stay overnight. He is agreeable.    MEDICAL DECISION MAKING  Patient with history of atrial fibrillation on Pradaxa, hypertension and hyperlipidemia who presents after a syncopal episode with forehead abrasion and left shoulder pain. He is afebrile, mildly hypotensive on arrival with otherwise normal vitals. He has no focal neurologic deficits concerning for CVA. CT is negative for intracranial hemorrhage, skull fracture, or Cspine fracture.  EKG demonstrates atrial fibrillation without evidence of ischemia or arrhythmia. Labs are otherwise reassuring without evidence of electrolyte abnormality or anemia. Chest x-ray is negative for rib fracture or pneumothorax. Left shoulder x-ray demonstrates anterior dislocation which was reduced at bedside following conscious sedation. Patient tolerated this well. Post reduction x-rays demonstrates successful reduction without evidence of fracture. Plan for admission for telemetry monitoring and further workup of the patient's syncope.      9:16 PM Patient reevaluated at bedside. Patient resting comfortably and in no acute distress. Discussed resulted studies. Patient's shoulder is dislocated. Discussed plan of care including conscious sedation and reduction. Discussed risks of above procedures and obtained consent.    9:28 PM Performed conscious sedation procedure. See above for details.    9:30 PM Performed joint reduction procedure. See above for details.    9:45 PM Spoke to Dr. Louis, hospitalist, about the patient's case. They will admit for further care and evaluation.     10:52 PM Patient reevaluated at bedside. Vital signs improved following IV fluids. He is alert without further hypoxia following conscious sedation. Patient and wife were updated with plan of care and agreeable. Patient is stable at this time for transfer to the  floor.        DISPOSITION:  Patient will be admitted to [facility] in guarded condition.    IMPRESSION  (R55) Syncope, unspecified syncope type  (S00.81XA) Abrasion of forehead, initial encounter  (S09.90XA) Closed head injury, initial encounter  (S42.92XA) Traumatic closed displaced fracture of left shoulder with anterior dislocation, initial encounter    Results, diagnoses, and treatment options were discussed with the patient and/or family. Patient verbalized understanding of plan of care.    New Prescriptions    No medications on file     Veilka RODRÍGUEZ ([place]), am scribing for, and in the presence of, Rosalva Ennis M.D.    Electronically signed by: Vielka Su ([place]), 7/16/2018    IRosalva M.D. personally performed the services described in this documentation, as scribed by Vielka Su in my presence, and it is both accurate and complete.    The note accurately reflects work and decisions made by me.  Rosalva Ennis  7/17/2018  2:16 AM

## 2018-07-18 NOTE — DISCHARGE SUMMARY
Hospital Medicine Discharge Note     Admit Date:  7/16/2018       Discharge Date:   7/17/2018    Attending Physician:  Vahid Coats M.D.      Diagnoses (includes active and resolved):     Principal Problem:    Syncope POA: Possible due to vasovagal versus loss of balance and fall  Active Problems:    Chronic atrial fibrillation (HCC) (Chronic) POA: Unknown    Anterior dislocation of left shoulder POA: Unknown    Acute encephalopathy POA: Unknown    Hypothyroidism (Chronic) POA: Unknown      Hospital Summary (Brief Narrative):         Patient is a 72-year-old male history of hypertension, atrial fibrillation, dyslipidemia, hypothyroidism, obesity was admitted after a fall [place] a casino.  He had been visiting from [facility] and reportedly had not much to eat or drink in the morning did have 2 alcoholic beverages.  Patient reportedly in the bathroom when he stood up and bent over had loss of balance.  He was not definitive if he had truly passed out.  He had findings of abrasions to his face, anterior dislocation of the left shoulder that was reduced in the ER.  On reevaluation he was sedate with encephalopathy from procedure.  Patient was given IV fluids differential included vasovagal syncope versus simply losing his balance.  He was admitted overnight for observation.  No signs for hypoglycemia.  Had elevated white count of 13,000 likely stress related as he had no symptoms of acute infection.  Carotid studies unremarkable and echocardiogram revealed no significant valvular stenosis, preserved LV function.  Telemetry revealed A. fib 50s-60s at times heart rate down to 40s, asymptomatic.  On reevaluation his cardiac, pulmonary exam, neuro exam unremarkable/nonfocal.  He has no complaints of shoulder pain .  Advised to use a sling for support.   Patient was discharged home and advised to follow-up with his primary care provider for referral to orthopedics when returned to [facility] with [facility].      Consultants:        None     Imaging/ Testing:      Carotid Duplex ([facility] Only)   Final Result   Carotid Duplex   Report     Vascular Laboratory   CONCLUSIONS   Normal bilateral carotid exam.      SUMEET [facility]     Exam Date:     07/17/2018 11:14   ECHOCARDIOGRAM-COMP W/ CONT   Final Result   LV EF:  65    %    FINDINGS  Left Ventricle  Contrast was used to enhance visualization of the endocardial border. 3   mL of contrast was administered. Existing IV was used. Located at the   right antecubital. Left ventricle is mildly dilated. Mild concentric   left ventricular hypertrophy. Normal left ventricular systolic   function. Left ventricular ejection fraction is visually estimated to   be 70%. Normal regional wall motion. Diastolic function is difficult to   assess with atrial fibrillation.    Right Ventricle  Moderately dilated right ventricle. Normal right ventricular systolic   function.    Right Atrium  The right atrium is normal in size.  Normal inferior vena cava size and   inspiratory collapse.    Left Atrium  Mildly dilated left atrium. Left atrial volume index is 36 mL/sq m.    Mitral Valve  The mitral valve is not well visualized. No stenosis or regurgitation   seen.    Aortic Valve  The aortic valve is not well visualized. No stenosis or regurgitation   seen.    Tricuspid Valve  Structurally normal tricuspid valve without significant stenosis. Mild   tricuspid regurgitation. Estimated right ventricular systolic pressure    is 35 mmHg. Right atrial pressure is estimated to be 3 mmHg.    Pulmonic Valve  The pulmonic valve is not well visualized. No pulmonic stenosis. Mild   pulmonic insufficiency.    Pericardium  Normal pericardium without effusion.    Aorta  The aortic root is normal.  Ascending aorta diameter is 3.6 cm.                      Mark Olson  (Electronically Signed)  Final Date:     17 July 2018    DX-SHOULDER 2+ LEFT   Final Result      Successful  relocation of anterior dislocated left humeral head.      CT-CSPINE WITHOUT PLUS RECONS   Final Result         1.  Negative CT scan of the cervical spine.  No fracture or subluxation.      2.  Multilevel degenerative change greatest at C5-6.      CT-HEAD W/O   Final Result      1.  No acute intracranial process.      2.  Atrophy and small vessel ischemic change.      DX-CHEST-PORTABLE (1 VIEW)   Final Result   Addendum 1 of 1   There is acute anterior subcoracoid dislocation of the left humeral head.         Final      No acute cardiac or pulmonary abnormality is noted.      DX-SHOULDER 2+ LEFT   Final Result      Acute anterior subcoracoid dislocation of the left humeral head. No fracture identified.            Discharge Medications:             Medication List      CONTINUE taking these medications      Instructions   dabigatran 150 MG Caps capsule  Commonly known as:  PRADAXA   Take 150 mg by mouth 2 Times a Day.  Dose:  150 mg     furosemide 20 MG Tabs  Commonly known as:  LASIX   Take 20-40 mg by mouth 2 times a day. 40 mg in AM 20 mg in afternoon  Dose:  20-40 mg     latanoprost 0.005 % Soln  Commonly known as:  XALATAN   Place 1 Drop in both eyes every evening.  Dose:  1 Drop     simvastatin 20 MG Tabs  Commonly known as:  ZOCOR   Take 20 mg by mouth every evening.  Dose:  20 mg     valsartan 160 MG Tabs  Commonly known as:  DIOVAN   Take 160 mg by mouth every day.  Dose:  160 mg               Diet:       DIET ORDERS (Through next 24h)    Cardiac             Activity:   As tolerated.      Code status:   Full code        Follow up appointment details :      .  Primary care provider at [facility]     In 1 week        Time spent on discharge day patient visit: 35 minutes    #################################################

## 2018-10-04 LAB — EKG IMPRESSION: NORMAL
